# Patient Record
Sex: FEMALE | Race: WHITE | URBAN - METROPOLITAN AREA
[De-identification: names, ages, dates, MRNs, and addresses within clinical notes are randomized per-mention and may not be internally consistent; named-entity substitution may affect disease eponyms.]

---

## 2020-02-18 ENCOUNTER — OFFICE VISIT (OUTPATIENT)
Dept: PRIMARY CARE CLINIC | Age: 30
End: 2020-02-18

## 2020-02-18 VITALS
SYSTOLIC BLOOD PRESSURE: 123 MMHG | BODY MASS INDEX: 31.92 KG/M2 | TEMPERATURE: 98.1 F | HEART RATE: 72 BPM | HEIGHT: 71 IN | WEIGHT: 228 LBS | OXYGEN SATURATION: 98 % | DIASTOLIC BLOOD PRESSURE: 84 MMHG | RESPIRATION RATE: 16 BRPM

## 2020-02-18 DIAGNOSIS — K59.00 CONSTIPATION, UNSPECIFIED CONSTIPATION TYPE: ICD-10-CM

## 2020-02-18 DIAGNOSIS — E66.9 OBESITY (BMI 30.0-34.9): Primary | ICD-10-CM

## 2020-02-18 RX ORDER — NORGESTIMATE AND ETHINYL ESTRADIOL 0.25-0.035
1 KIT ORAL DAILY
COMMUNITY
Start: 2019-12-13

## 2020-02-18 RX ORDER — POLYETHYLENE GLYCOL 3350 17 G/17G
17 POWDER, FOR SOLUTION ORAL DAILY
Qty: 510 G | Refills: 0 | Status: SHIPPED | OUTPATIENT
Start: 2020-02-18 | End: 2020-06-23 | Stop reason: SDUPTHER

## 2020-02-18 NOTE — PATIENT INSTRUCTIONS
WEIGHT LOSS RECOMMENDATIONS: 
 
[facility] Medically Supervised Weight Loss Program: - Multidisciplinary & holistic approach to your weight loss - [phone] Fiorella Rincon:  
            - [address] [facility]. [facility] 
 - [phone] 
 - [email or web address] 
 - 3 month initial course - Initial fee + monthly membership fee [facility] - Dr Yakelin Murillo 
 - Priyanka Leavitt. [facility] 
 - [phone] - [email or web address] 
  
ACAC PREP Program (Physician Recommend Exercise Program 
 - $60 for 60 days Weight Watchers: 
 - See website Saba Renee: - See website New Technology: - My Fitness Pal or other Apps for your phone - FitBit or FuelBand Medications: 
 - Contrave (1 tab twice daily) - Saxenda (1 injection daily) - Phentermine (1 tab daily) Aerobic exercise: goal of 3-5 times per week, about 30 minutes Diet changes: limiting daily calorie intake to 2,000. Work on reading nutrition labels on food (in particular the serving size, the calories per serving, and carbohydrates). Work on decreasing portion sizes & snacking. Constipation: Care Instructions Your Care Instructions Constipation means that you have a hard time passing stools (bowel movements). People pass stools from 3 times a day to once every 3 days. What is normal for you may be different. Constipation may occur with pain in the rectum and cramping. The pain may get worse when you try to pass stools. Sometimes there are small amounts of bright red blood on toilet paper or the surface of stools. This is because of enlarged veins near the rectum (hemorrhoids). A few changes in your diet and lifestyle may help you avoid ongoing constipation. Your doctor may also prescribe medicine to help loosen your stool. Some medicines can cause constipation.  These include pain medicines and antidepressants. Tell your doctor about all the medicines you take. Your doctor may want to make a medicine change to ease your symptoms. Follow-up care is a key part of your treatment and safety. Be sure to make and go to all appointments, and call your doctor if you are having problems. It's also a good idea to know your test results and keep a list of the medicines you take. How can you care for yourself at home? · Drink plenty of fluids, enough so that your urine is light yellow or clear like water. If you have kidney, heart, or liver disease and have to limit fluids, talk with your doctor before you increase the amount of fluids you drink. · Include high-fiber foods in your diet each day. These include fruits, vegetables, beans, and whole grains. · Get at least 30 minutes of exercise on most days of the week. Walking is a good choice. You also may want to do other activities, such as running, swimming, cycling, or playing tennis or team sports. · Take a fiber supplement, such as Citrucel or Metamucil, every day. Read and follow all instructions on the label. · Schedule time each day for a bowel movement. A daily routine may help. Take your time having your bowel movement. · Support your feet with a small step stool when you sit on the toilet. This helps flex your hips and places your pelvis in a squatting position. · Your doctor may recommend an over-the-counter laxative to relieve your constipation. Examples are Milk of Magnesia and MiraLax. Read and follow all instructions on the label. Do not use laxatives on a long-term basis. When should you call for help? Call your doctor now or seek immediate medical care if: 
  · You have new or worse belly pain.  
  · You have new or worse nausea or vomiting.  
  · You have blood in your stools.  
 Watch closely for changes in your health, and be sure to contact your doctor if: 
  · Your constipation is getting worse.   · You do not get better as expected. Where can you learn more? Go to http://stalin-oswaldo.info/. Enter 21  in the search box to learn more about \"Constipation: Care Instructions. \" Current as of: June 26, 2019 Content Version: 12.2 © 4016-0110 [employer], Perfect[employer]. Care instructions adapted under license by [employer] (which disclaims liability or warranty for this information). If you have questions about a medical condition or this instruction, always ask your healthcare professional. [address] any warranty or liability for your use of this information.

## 2020-02-18 NOTE — PROGRESS NOTES
Artur Fitzgerald is a 30 y.o. female    Chief Complaint   Patient presents with   [facility]     has been trying to loose weight, measures calories works out 6 days a week- wants to check thyroid,     Other     does not have BM besides every 7-10 days, not constipated but knows this isn't normal. Never seen GI.        1. Have you been to the ER, urgent care clinic since your last visit? Hospitalized since your last visit? No    2. Have you seen or consulted any other health care providers outside of the [address] [place] [address] since your last visit? Include any pap smears or colon screening. No    No flowsheet data found.      Health Maintenance Due   Topic Date Due    DTaP/Tdap/Td series (1 - Tdap) 11/29/2002    PAP AKA CERVICAL CYTOLOGY  11/29/2012    Influenza Age 5 to Adult  08/01/2019

## 2020-02-18 NOTE — PROGRESS NOTES
HPI:     Chief Complaint   Patient presents with    Establish Care    Weight Management     has been trying to loose weight, measures calories works out 6 days a week- wants to check thyroid,     Other     does not have BM besides every 7-10 days, not constipated but knows this isn't normal. Never seen GI. Patient is a 30 y.o. female with no significant history who presents as new patient for evaluation of weight and constipation. Patient reports she has been struggling with her weight over the past couple years despite diet and exercise. She works out 6 days per week since summer 2019 for 1.5-2 hours per day, including cardio and weight training. Spends 45 minutes doing cardio (rowing or inclined treadmill). She was previously doing CrossFit. She consumes about 2,000 calories per day. Tried to cut down to 1,600 calories per day as per recommendation of , but this left her very hungry. She rarely eats junk food. Typical breakfast includes eggs and vegetables. Lunch is leftover dinner. Dinner is chicken or fish with grilled veggies, sweet potatoes. Daytime snacks include fruit (orange, apple). She drinks alcohol very rarely. She will lose 2-3 lbs here and there but not consistent. Patient also complains of constipation over the past 3 years. She has very large BM about once per week. Denies bloody stools, abdominal pain, nausea, vomiting. She drinks about 110 oz water daily. She takes probiotics intermittently. Patient has hx of abnormal pap smear s/p colposcopy. She is following regularly with GYN Dr. Duncan Nugent. Patient Active Problem List   Diagnosis Code    Obesity (BMI 30.0-34. 9) E66.9     Current Outpatient Medications   Medication Sig Dispense Refill    SPRINTEC, 28, 0.25-35 mg-mcg tab Take 1 Tab by mouth daily.  polyethylene glycol (MIRALAX) 17 gram/dose powder Take 17 g by mouth daily.  510 g 0     Allergies   Allergen Reactions    Pcn [Penicillins] Hives    Sulfa (Sulfonamide Antibiotics) Hives     Past Medical History:   Diagnosis Date    Abnormal Pap smear of cervix     s/p colpo    Constipation      Past Surgical History:   Procedure Laterality Date    HX APPENDECTOMY  2016    HX COLPOSCOPY      HX TONSIL AND ADENOIDECTOMY      HX TYMPANOSTOMY      HX WISDOM TEETH EXTRACTION       Family History   Problem Relation Age of Onset    Hypertension Mother     No Known Problems Father     Stroke Maternal Grandmother     Heart Disease Maternal Grandfather         CABG    Heart Attack Maternal Grandfather     High Cholesterol Maternal Grandfather     Cancer Maternal Grandfather         bone    Cancer Paternal Grandfather         bone    Diabetes Neg Hx     Thyroid Disease Neg Hx      Social History     Tobacco Use    Smoking status: Former Smoker    Smokeless tobacco: Never Used    Tobacco comment: socially when younger   Substance Use Topics    Alcohol use: Yes     Comment: rarely          ROS:   Pertinent items are noted in HPI. Objective:     Vitals:    02/18/20 0935   BP: 123/84   Pulse: 72   Resp: 16   Temp: 98.1 °F (36.7 °C)   TempSrc: Oral   SpO2: 98%   Weight: 228 lb (103.4 kg)   Height: 5' 10.5\" (1.791 m)        Vitals and Nurse Documentation reviewed.     Physical Examination:   General appearance - alert, well appearing, and in no distress  Mental status - alert, oriented to person, place, and time, normal mood, behavior, speech, dress, motor activity, and thought processes  Eyes - pupils equal and reactive, extraocular eye movements intact  Ears - bilateral TM's and external ear canals normal  Nose - normal and patent, no erythema, discharge or polyps  Mouth - mucous membranes moist, pharynx normal without lesions  Neck - supple, no significant adenopathy, thyroid exam: thyroid is normal in size without nodules or tenderness  Chest - clear to auscultation, no wheezes, rales or rhonchi, symmetric air entry  Heart - normal rate, regular rhythm, normal S1, S2, no murmurs, rubs, clicks or gallops  Abdomen - soft, nontender, nondistended, no masses or organomegaly  Neurological - alert, oriented, normal speech, no focal findings or movement disorder noted  Extremities - peripheral pulses normal, no pedal edema, no clubbing or cyanosis      Assessment/ Plan:   Diagnoses and all orders for this visit:    1. Obesity (BMI 30.0-34.9)  -     BMI 32.25. Patient has been struggling with weight loss. Has been exercising regularly and monitoring intake with only minor losses. Check labs as below. Advised to continue regular exercise, at least 150 minutes per week, and try 1,800 calorie/day diet. Discussed healthy diet choices and limiting fried, fatty foods, fast foods, processed foods, sugar-sweetened beverages/soda, and added sugars. Increase fruits, vegetables, low-fat dairy products, lean proteins, and whole grains. We discussed possibility of medication assistance and patient is interested. We discussed options that patient will look into at home and inquire about insurance coverage. Information provided in AVS.  -     CBC WITH AUTOMATED DIFF  -     METABOLIC PANEL, COMPREHENSIVE  -     THYROID CASCADE PROFILE  -     HEMOGLOBIN A1C WITH EAG    2. Constipation, unspecified constipation type  -     Discussed goal of BM daily to every other day. Start with Miralax. Increase fiber through diet (fruits, vegetables) or supplement. Continue with adequate water intake. Continue exercise. -     CBC WITH AUTOMATED DIFF  -     METABOLIC PANEL, COMPREHENSIVE  -     THYROID CASCADE PROFILE  -     polyethylene glycol (MIRALAX) 17 gram/dose powder; Take 17 g by mouth daily. Medication benefits, risks, indication, dosage, potential adverse effects, and alternate medication options were discussed with patient who expressed understanding. Follow-up and Dispositions    · Return in about 6 weeks (around 3/31/2020) for follow-up. I have discussed the diagnosis with the patient and the intended plan as seen in the above orders. Advised prompt follow-up if symptoms worsen or fail to improve and symptoms that would warrant emergent evaluation in ED. The patient has received an after-visit summary and questions were answered concerning future plans. I have discussed medication side effects and warnings with the patient as well. Patient expressed understanding and is in agreement with the diagnosis and plan.

## 2020-02-19 LAB
ALBUMIN SERPL-MCNC: 4.3 G/DL (ref 3.9–5)
ALBUMIN/GLOB SERPL: 1.9 {RATIO} (ref 1.2–2.2)
ALP SERPL-CCNC: 66 IU/L (ref 39–117)
ALT SERPL-CCNC: 15 IU/L (ref 0–32)
AST SERPL-CCNC: 13 IU/L (ref 0–40)
BASOPHILS # BLD AUTO: 0.1 X10E3/UL (ref 0–0.2)
BASOPHILS NFR BLD AUTO: 1 %
BILIRUB SERPL-MCNC: 0.2 MG/DL (ref 0–1.2)
BUN SERPL-MCNC: 10 MG/DL (ref 6–20)
BUN/CREAT SERPL: 13 (ref 9–23)
CALCIUM SERPL-MCNC: 9.6 MG/DL (ref 8.7–10.2)
CHLORIDE SERPL-SCNC: 102 MMOL/L (ref 96–106)
CO2 SERPL-SCNC: 22 MMOL/L (ref 20–29)
CREAT SERPL-MCNC: 0.77 MG/DL (ref 0.57–1)
EOSINOPHIL # BLD AUTO: 0.1 X10E3/UL (ref 0–0.4)
EOSINOPHIL NFR BLD AUTO: 1 %
ERYTHROCYTE [DISTWIDTH] IN BLOOD BY AUTOMATED COUNT: 12.7 % (ref 11.7–15.4)
EST. AVERAGE GLUCOSE BLD GHB EST-MCNC: 100 MG/DL
GLOBULIN SER CALC-MCNC: 2.3 G/DL (ref 1.5–4.5)
GLUCOSE SERPL-MCNC: 82 MG/DL (ref 65–99)
HBA1C MFR BLD: 5.1 % (ref 4.8–5.6)
HCT VFR BLD AUTO: 37.7 % (ref 34–46.6)
HGB BLD-MCNC: 12.1 G/DL (ref 11.1–15.9)
IMM GRANULOCYTES # BLD AUTO: 0 X10E3/UL (ref 0–0.1)
IMM GRANULOCYTES NFR BLD AUTO: 0 %
LYMPHOCYTES # BLD AUTO: 2.3 X10E3/UL (ref 0.7–3.1)
LYMPHOCYTES NFR BLD AUTO: 25 %
MCH RBC QN AUTO: 28.3 PG (ref 26.6–33)
MCHC RBC AUTO-ENTMCNC: 32.1 G/DL (ref 31.5–35.7)
MCV RBC AUTO: 88 FL (ref 79–97)
MONOCYTES # BLD AUTO: 0.6 X10E3/UL (ref 0.1–0.9)
MONOCYTES NFR BLD AUTO: 7 %
NEUTROPHILS # BLD AUTO: 6.1 X10E3/UL (ref 1.4–7)
NEUTROPHILS NFR BLD AUTO: 66 %
PLATELET # BLD AUTO: 285 X10E3/UL (ref 150–450)
POTASSIUM SERPL-SCNC: 4.4 MMOL/L (ref 3.5–5.2)
PROT SERPL-MCNC: 6.6 G/DL (ref 6–8.5)
RBC # BLD AUTO: 4.28 X10E6/UL (ref 3.77–5.28)
SODIUM SERPL-SCNC: 137 MMOL/L (ref 134–144)
TSH SERPL DL<=0.005 MIU/L-ACNC: 2.42 UIU/ML (ref 0.45–4.5)
WBC # BLD AUTO: 9.3 X10E3/UL (ref 3.4–10.8)

## 2020-02-19 NOTE — PROGRESS NOTES
Please notify patient:    CBC, kidney, liver, thyroid, and average blood sugar level are all normal.

## 2020-03-31 ENCOUNTER — VIRTUAL VISIT (OUTPATIENT)
Dept: PRIMARY CARE CLINIC | Age: 30
End: 2020-03-31

## 2020-03-31 VITALS — WEIGHT: 220 LBS | BODY MASS INDEX: 31.12 KG/M2

## 2020-03-31 DIAGNOSIS — K59.01 SLOW TRANSIT CONSTIPATION: ICD-10-CM

## 2020-03-31 DIAGNOSIS — E66.9 OBESITY (BMI 30.0-34.9): Primary | ICD-10-CM

## 2020-03-31 DIAGNOSIS — Z76.89 ENCOUNTER FOR WEIGHT MANAGEMENT: ICD-10-CM

## 2020-03-31 NOTE — PATIENT INSTRUCTIONS
Eating Healthy Foods: Care Instructions Your Care Instructions Eating healthy foods can help lower your risk for disease. Healthy food gives you energy and keeps your heart strong, your brain active, your muscles working, and your bones strong. A healthy diet includes a variety of foods from the basic food groups: grains, vegetables, fruits, milk and milk products, and meat and beans. Some people may eat more of their favorite foods from only one food group and, as a result, miss getting the nutrients they need. So, it is important to pay attention not only to what you eat but also to what you are missing from your diet. You can eat a healthy, balanced diet by making a few small changes. Follow-up care is a key part of your treatment and safety. Be sure to make and go to all appointments, and call your doctor if you are having problems. It's also a good idea to know your test results and keep a list of the medicines you take. How can you care for yourself at home? Look at what you eat · Keep a food diary for a week or two and record everything you eat or drink. Track the number of servings you eat from each food group. · For a balanced diet every day, eat a variety of: 
? 6 or more ounce-equivalents of grains, such as cereals, breads, crackers, rice, or pasta, every day. An ounce-equivalent is 1 slice of bread, 1 cup of ready-to-eat cereal, or ½ cup of cooked rice, cooked pasta, or cooked cereal. 
? 2½ cups of vegetables, especially: § Dark-green vegetables such as broccoli and spinach. § Orange vegetables such as carrots and sweet potatoes. § Dry beans (such as mendez and kidney beans) and peas (such as lentils). ? 2 cups of fresh, frozen, or canned fruit. A small apple or 1 banana or orange equals 1 cup. ? 3 cups of nonfat or low-fat milk, yogurt, or other milk products.  
? 5½ ounces of meat and beans, such as chicken, fish, lean meat, beans, nuts, and seeds. One egg, 1 tablespoon of peanut butter, ½ ounce nuts or seeds, or ¼ cup of cooked beans equals 1 ounce of meat. · Learn how to read food labels for serving sizes and ingredients. Fast-food and convenience-food meals often contain few or no fruits or vegetables. Make sure you eat some fruits and vegetables to make the meal more nutritious. · Look at your food diary. For each food group, add up what you have eaten and then divide the total by the number of days. This will give you an idea of how much you are eating from each food group. See if you can find some ways to change your diet to make it more healthy. Start small · Do not try to make dramatic changes to your diet all at once. You might feel that you are missing out on your favorite foods and then be more likely to fail. · Start slowly, and gradually change your habits. Try some of the following: ? Use whole wheat bread instead of white bread. ? Use nonfat or low-fat milk instead of whole milk. ? Eat brown rice instead of white rice, and eat whole wheat pasta instead of white-flour pasta. ? Try low-fat cheeses and low-fat yogurt. ? Add more fruits and vegetables to meals and have them for snacks. ? Add lettuce, tomato, cucumber, and onion to sandwiches. ? Add fruit to yogurt and cereal. 
Enjoy food · You can still eat your favorite foods. You just may need to eat less of them. If your favorite foods are high in fat, salt, and sugar, limit how often you eat them, but do not cut them out entirely. · Eat a wide variety of foods. Make healthy choices when eating out · The type of restaurant you choose can help you make healthy choices. Even fast-food chains are now offering more low-fat or healthier choices on the menu. · Choose smaller portions, or take half of your meal home. · When eating out, try: ? A veggie pizza with a whole wheat crust or grilled chicken (instead of sausage or pepperoni). ? Pasta with roasted vegetables, grilled chicken, or marinara sauce instead of cream sauce. ? A vegetable wrap or grilled chicken wrap. ? Broiled or poached food instead of fried or breaded items. Make healthy choices easy · Buy packaged, prewashed, ready-to-eat fresh vegetables and fruits, such as baby carrots, salad mixes, and chopped or shredded broccoli and cauliflower. · Buy packaged, presliced fruits, such as melon or pineapple. · Choose 100% fruit or vegetable juice instead of soda. Limit juice intake to 4 to 6 oz (½ to ¾ cup) a day. · Blend low-fat yogurt, fruit juice, and canned or frozen fruit to make a smoothie for breakfast or a snack. Where can you learn more? Go to http://stalin-oswaldo.info/ Enter T756 in the search box to learn more about \"Eating Healthy Foods: Care Instructions. \" Current as of: August 21, 2019Content Version: 12.4 © 7580-0007 Healthwise, Incorporated. Care instructions adapted under license by [employer] (which disclaims liability or warranty for this information). If you have questions about a medical condition or this instruction, always ask your healthcare professional. [address] any warranty or liability for your use of this information.

## 2020-03-31 NOTE — PROGRESS NOTES
Vernon Chavez is a 30 y.o. female who was seen by synchronous (real-time) audio-video technology on 3/31/2020. Consent:  She and/or her healthcare decision maker is aware that this patient-initiated Telehealth encounter is a billable service, with coverage as determined by her insurance carrier. She is aware that she may receive a bill and has provided verbal consent to proceed: Yes    I was in the office while conducting this encounter. Assessment & Plan:   Diagnoses and all orders for this visit:    1. Obesity (BMI 30.0-34.9)         -     Has lost 8 lbs since last visit. Encouraged to continue with healthy diet and exercise. Advised at least 150 minutes of physical activity per week, back to gym when COVID crisis resolves. Continue to track calories, goal 1,600 deanne/day. Discussed healthy diet options and meal planning. Recommended frequent small meals. Nutrient dense foods, plenty of fruits, vegetables, whole grains. Continue to avoid junk/processed/nutrient poor foods. Choose healthy options when eating out. Patient is not interested in medication assistance at this time, is confident she can lose weight on her own with lifestyle modification. 2. Encounter for weight management          -     See #1.    3. Slow transit constipation          -      Improved. Continue Miralax as needed, plenty of fiber and water. Coding Help - Use CPT Codes 89960-59820, 85534-94264 for Established and New Patients respectively, either employing EM elements or Time rules. Other codes (example consult codes) may also apply. I spent at least 15 minutes with this established patient, and >50% of the time was spent counseling and/or coordinating care regarding weight management. 712  Subjective: Veronn Chavez was seen for Follow-up and Weight Management      Patient presents for follow-up from last visit. Patient has been struggling with weight over the past couple years.   She is motivated to lose weight and has been working on diet and exercise. She has lost 8 lbs since last visit 6 weeks ago. She did have oral surgery in the interim and was on liquid/soft diet for a couple weeks. Due to COVID-19 she is limited in her ability to go to the gym as she had been previously (6 days in the gym, 45 minutes cardio/rowing/inclined treadmill + weight training). Instead she has been brisk walking 2-2.5 miles everyday with her dogs. She has cut back on her daily calories, 2,000/day to 1,600-1,700/day. She has been tracking calories with phone dmitri. She does not consume junk food. Eats plenty of fruits and vegetables. No red meat. Daytime snacking is usually fruit. Drinks alcohol rarely. She has noticed improvement with bowel movements since starting Miralax, which she uses as needed about twice per week. She now has BM about 4 times per week, which is improvement from once weekly BM. Patient otherwise feels well today and has no concerns or complaints. Prior to Admission medications    Medication Sig Start Date End Date Taking? Authorizing Provider   [address] [place] [address], , 0.25-35 mg-mcg tab Take 1 Tab by mouth daily. 12/13/19   Provider, Historical   polyethylene glycol (MIRALAX) 17 gram/dose powder Take 17 g by mouth daily. 5/83/72   Jourdan Hale NP     Allergies   Allergen Reactions    Pcn [Penicillins] Hives    Sulfa (Sulfonamide Antibiotics) Hives       Patient Active Problem List   Diagnosis Code    Obesity (BMI 30.0-34. 9) E66.9     Past Medical History:   Diagnosis Date    Abnormal Pap smear of cervix     s/p colpo    Constipation        Review of Systems   Constitutional: Negative for chills and fever. Respiratory: Negative for cough and shortness of breath. Cardiovascular: Negative for chest pain and palpitations. Gastrointestinal: Negative for abdominal pain, diarrhea, nausea and vomiting.        PHYSICAL EXAMINATION:  [ INSTRUCTIONS:  \"[x]\" Indicates a positive item \"[]\" Indicates a negative item  -- DELETE ALL ITEMS NOT EXAMINED]  Vital Signs: (As obtained by patient/caregiver at home)  Visit Vitals  Wt 220 lb (99.8 kg)   BMI 31.12 kg/m²        Constitutional: [x] Appears well-developed and well-nourished [x] No apparent distress      [] Abnormal -     Mental status: [x] Alert and awake  [x] Oriented to person/place/time [x] Able to follow commands    [] Abnormal -     Eyes:   EOM    [x]  Normal    [] Abnormal -   Sclera  [x]  Normal    [] Abnormal -          Discharge [x]  None visible   [] Abnormal -     HENT: [x] Normocephalic, atraumatic  [] Abnormal -   [x] Mouth/Throat: Mucous membranes are moist    External Ears [x] Normal  [] Abnormal -    Neck: [x] No visualized mass [] Abnormal -     Pulmonary/Chest: [x] Respiratory effort normal   [x] No visualized signs of difficulty breathing or respiratory distress        [] Abnormal -      Musculoskeletal:   [x] Normal gait with no signs of ataxia         [x] Normal range of motion of neck        [] Abnormal -     Neurological:        [x] No Facial Asymmetry (Cranial nerve 7 motor function) (limited exam due to video visit)          [x] No gaze palsy        [] Abnormal -          Skin:        [x] No significant exanthematous lesions or discoloration noted on facial skin         [] Abnormal -            Psychiatric:       [x] Normal Affect [] Abnormal -        [x] No Hallucinations    Other pertinent observable physical exam findings:-      THIS VISIT WAS COMPLETED VIRTUALLY USING [employer] VIDEO VISIT. We discussed the expected course, resolution and complications of the diagnosis(es) in detail. Medication risks, benefits, costs, interactions, and alternatives were discussed as indicated. I advised her to contact the office if her condition worsens, changes or fails to improve as anticipated. She expressed understanding with the diagnosis(es) and plan.      Pursuant to the emergency declaration under the [address] and the National Emergencies Act, [facility] waiver authority and the [employer] and [employer] Act, this Virtual  Visit was conducted, with patient's consent, to reduce the patient's risk of exposure to COVID-19 and provide continuity of care for an established patient. Services were provided through a video synchronous discussion virtually to substitute for in-person clinic visit.     Enmanuel Wolfe NP

## 2020-06-23 ENCOUNTER — VIRTUAL VISIT (OUTPATIENT)
Dept: PRIMARY CARE CLINIC | Age: 30
End: 2020-06-23

## 2020-06-23 DIAGNOSIS — K59.00 CONSTIPATION, UNSPECIFIED CONSTIPATION TYPE: Primary | ICD-10-CM

## 2020-06-23 RX ORDER — POLYETHYLENE GLYCOL 3350 17 G/17G
POWDER, FOR SOLUTION ORAL
Qty: 510 G | Refills: 1 | Status: SHIPPED | OUTPATIENT
Start: 2020-06-23

## 2020-06-23 NOTE — PROGRESS NOTES
Jamil Olvera is a 30 y.o. female who was seen by synchronous (real-time) audio-video technology on 6/23/2020. Consent: Jamil Olvera, who was seen by synchronous (real-time) audio-video technology, and/or her healthcare decision maker, is aware that this patient-initiated, Telehealth encounter on 6/23/2020 is a billable service, with coverage as determined by her insurance carrier. She is aware that she may receive a bill and has provided verbal consent to proceed: Yes. Assessment & Plan:   Diagnoses and all orders for this visit:    1. Constipation, unspecified constipation type  -    Advised start taking Miralax regularly. Advised to follow up with GI to r/o any IBS, IBD. Discussed about Linzess options as well. Hydration, high fibre intake. polyethylene glycol (MIRALAX) 17 gram/dose powder; Take one capful BID for 7 days and then once/day afterwards.  -     REFERRAL TO GASTROENTEROLOGY- contact info sent through my chart message to her. Follow-up and Dispositions    · Return if symptoms worsen or fail to improve. Subjective: Jamil Olvera is a 30 y.o. female who was seen for Other (BM once/week or sometimes even longer. Feels full. )      She is new to me but established in office. This is a 31 y/o F is here with concern about not having BM regularly. Symptoms started about a year ago. About 4 months ago she was started on Miralax. She did well when she was taking the Miralax but lately BM is once/week or sometimes even comes more than week after. She is taking Miralax but very rarely. Stool is usually soft and normal looking. No blood. She does not eat and  junk food. Eats plenty of water, fruits and vegetables. No red meat. No family hx of colon cancer, IBS, IBD. Denies any anxiety, abdominal pain. Recent thyroid level was in normal range.     Lab Results   Component Value Date/Time    TSH 2.420 02/18/2020 10:20 AM            Prior to Admission medications    Medication Sig Start Date End Date Taking? Authorizing Provider   polyethylene glycol (MIRALAX) 17 gram/dose powder Take one capful BID for 7 days and then once/day afterwards. 6/23/20  Yes Wayne Lacey MD   SPRINTEC, 28, 0.25-35 mg-mcg tab Take 1 Tab by mouth daily. 12/13/19   Provider, Historical     Allergies   Allergen Reactions    Pcn [Penicillins] Hives    Sulfa (Sulfonamide Antibiotics) Hives       Patient Active Problem List   Diagnosis Code    Obesity (BMI 30.0-34. 9) E66.9     Current Outpatient Medications   Medication Sig Dispense Refill    polyethylene glycol (MIRALAX) 17 gram/dose powder Take one capful BID for 7 days and then once/day afterwards. 510 g 1    SPRINTEC, 28, 0.25-35 mg-mcg tab Take 1 Tab by mouth daily. Allergies   Allergen Reactions    Pcn [Penicillins] Hives    Sulfa (Sulfonamide Antibiotics) Hives     Past Medical History:   Diagnosis Date    Abnormal Pap smear of cervix     s/p colpo    Constipation        ROS    Same as #1  Objective:   Vital Signs: (As obtained by patient/caregiver at home)  There were no vitals taken for this visit.      [INSTRUCTIONS:  \"[x]\" Indicates a positive item  \"[]\" Indicates a negative item  -- DELETE ALL ITEMS NOT EXAMINED]    Constitutional: [x] Appears well-developed and well-nourished [x] No apparent distress      [] Abnormal -     Mental status: [x] Alert and awake  [x] Oriented to person/place/time [x] Able to follow commands    [] Abnormal -     Eyes:   EOM    [x]  Normal    [] Abnormal -   Sclera  [x]  Normal    [] Abnormal -          Discharge [x]  None visible   [] Abnormal -     HENT: [x] Normocephalic, atraumatic  [] Abnormal -   [x] Mouth/Throat: Mucous membranes are moist    External Ears [x] Normal  [] Abnormal -    Neck: [x] No visualized mass [] Abnormal -     Pulmonary/Chest: [x] Respiratory effort normal   [x] No visualized signs of difficulty breathing or respiratory distress        [] Abnormal - Musculoskeletal:   [] Normal gait with no signs of ataxia         [x] Normal range of motion of neck        [] Abnormal -     Neurological:        [x] No Facial Asymmetry (Cranial nerve 7 motor function) (limited exam due to video visit)          [x] No gaze palsy        [] Abnormal -          Skin:        [x] No significant exanthematous lesions or discoloration noted on facial skin         [] Abnormal -            Psychiatric:       [x] Normal Affect [] Abnormal -        [] No Hallucinations    Other pertinent observable physical exam findings:-        We discussed the expected course, resolution and complications of the diagnosis(es) in detail. Medication risks, benefits, costs, interactions, and alternatives were discussed as indicated. I advised her to contact the office if her condition worsens, changes or fails to improve as anticipated. She expressed understanding with the diagnosis(es) and plan. Williams Gurrola is a 30 y.o. female who was evaluated by a video visit encounter for concerns as above. Patient identification was verified prior to start of the visit. A caregiver was present when appropriate. Due to this being a TeleHealth encounter (During [facility]- public health emergency), evaluation of the following organ systems was limited: Vitals/Constitutional/EENT/Resp/CV/GI//MS/Neuro/Skin/Heme-Lymph-Imm. Pursuant to the emergency declaration under the [facility], [facility] waiver authority and the [employer] and [employer] Act, this Virtual  Visit was conducted, with patient's (and/or legal guardian's) consent, to reduce the patient's risk of exposure to COVID-19 and provide necessary medical care. Services were provided through a video synchronous discussion virtually to substitute for in-person clinic visit.         Nina Gonzalez MD

## 2020-12-04 ENCOUNTER — OFFICE VISIT (OUTPATIENT)
Dept: PRIMARY CARE CLINIC | Age: 30
End: 2020-12-04
Payer: COMMERCIAL

## 2020-12-04 VITALS
HEIGHT: 71 IN | TEMPERATURE: 98 F | RESPIRATION RATE: 16 BRPM | HEART RATE: 83 BPM | BODY MASS INDEX: 29.82 KG/M2 | OXYGEN SATURATION: 99 % | WEIGHT: 213 LBS | DIASTOLIC BLOOD PRESSURE: 84 MMHG | SYSTOLIC BLOOD PRESSURE: 124 MMHG

## 2020-12-04 DIAGNOSIS — Z79.899 HIGH RISK MEDICATION USE: ICD-10-CM

## 2020-12-04 DIAGNOSIS — Z71.3 WEIGHT LOSS COUNSELING, ENCOUNTER FOR: Primary | ICD-10-CM

## 2020-12-04 PROCEDURE — 93000 ELECTROCARDIOGRAM COMPLETE: CPT | Performed by: FAMILY MEDICINE

## 2020-12-04 PROCEDURE — 99214 OFFICE O/P EST MOD 30 MIN: CPT | Performed by: FAMILY MEDICINE

## 2020-12-04 RX ORDER — PHENTERMINE HYDROCHLORIDE 15 MG/1
15 CAPSULE ORAL
Qty: 30 CAP | Refills: 1 | Status: SHIPPED | OUTPATIENT
Start: 2020-12-04 | End: 2021-01-26 | Stop reason: SDUPTHER

## 2020-12-04 NOTE — PROGRESS NOTES
Subjective:     Chief Complaint   Patient presents with    Weight Management        She  is a 35y.o. year old female who presents today for weight management. She reports that she has tried different method to loose weight but nothing worked. Keto fo a while, not sustainable. Gotten down to 200 lbs after the keto diet but gained again to 213 today. Work out 4-5 days/week about 60 minutes each sessions. Have been watching diet very closely. Lab Results   Component Value Date/Time    TSH 2.420 02/18/2020 10:20 AM         Pertinent items are noted in HPI.   Objective:     Vitals:    12/04/20 0958   BP: 124/84   Pulse: 83   Resp: 16   Temp: 98 °F (36.7 °C)   TempSrc: Temporal   SpO2: 99%   Weight: 213 lb (96.6 kg)   Height: 5' 10.5\" (1.791 m)       Physical Examination: General appearance - alert, well appearing, and in no distress, oriented to person, place, and time and overweight  Mental status - alert, oriented to person, place, and time, normal mood, behavior, speech, dress, motor activity, and thought processes  Chest - clear to auscultation, no wheezes, rales or rhonchi, symmetric air entry  Heart - normal rate, regular rhythm, normal S1, S2, no murmurs, rubs, clicks or gallops    Allergies   Allergen Reactions    Amoxicillin-Pot Clavulanate Hives    Pcn [Penicillins] Hives    Sulfa (Sulfonamide Antibiotics) Hives      Social History     Socioeconomic History    Marital status: SINGLE     Spouse name: Not on file    Number of children: Not on file    Years of education: Not on file    Highest education level: Not on file   Tobacco Use    Smoking status: Former Smoker    Smokeless tobacco: Never Used    Tobacco comment: socially when younger   Substance and Sexual Activity    Alcohol use: Yes     Comment: rarely    Drug use: Never    Sexual activity: Yes     Partners: Male     Birth control/protection: Pill      Family History   Problem Relation Age of Onset    Hypertension Mother     No Known Problems Father     Stroke Maternal Grandmother     Heart Disease Maternal Grandfather         CABG    Heart Attack Maternal Grandfather     High Cholesterol Maternal Grandfather     Cancer Maternal Grandfather         bone    Cancer Paternal Grandfather         bone    Diabetes Neg Hx     Thyroid Disease Neg Hx       Past Surgical History:   Procedure Laterality Date    HX APPENDECTOMY  2016    HX COLPOSCOPY      HX TONSIL AND ADENOIDECTOMY      HX TYMPANOSTOMY      HX WISDOM TEETH EXTRACTION        Past Medical History:   Diagnosis Date    Abnormal Pap smear of cervix     s/p colpo    Constipation       Current Outpatient Medications   Medication Sig Dispense Refill    polyethylene glycol (MIRALAX) 17 gram/dose powder Take one capful BID for 7 days and then once/day afterwards. 510 g 1    SPRINTEC, 28, 0.25-35 mg-mcg tab Take 1 Tab by mouth daily. Assessment/ Plan:   Diagnoses and all orders for this visit:    1. Weight loss counseling, encounter for  -     AMB POC EKG ROUTINE W/ 12 LEADS, INTER & REP-  Is normal.  -     phentermine (ADIPEX_P) 15 mg capsule; Take 1 Cap by mouth every morning. Max Daily Amount: 15 mg.  -     REFERRAL TO DIABETIC EDUCATION; Standing              Continue work on diet and exercise. 2. BMI 30.0-30.9,adult  -     phentermine (ADIPEX_P) 15 mg capsule; Take 1 Cap by mouth every morning. Max Daily Amount: 15 mg.  -     REFERRAL TO DIABETIC EDUCATION; Standing    3. High risk medication use  -     AMB POC EKG ROUTINE W/ 12 LEADS, INTER & REP  -     phentermine (ADIPEX_P) 15 mg capsule; Take 1 Cap by mouth every morning. Max Daily Amount: 15 mg. Medication risks/benefits/costs/interactions/alternatives discussed with patient.   Advised patient to call back or return to office if symptoms worsen/change/persist. If patient cannot reach us or should anything more severe/urgent arise he/she should proceed directly to the nearest emergency department. Discussed expected course/resolution/complications of diagnosis in detail with patient. Patient given a written after visit summary which includes her diagnoses, current medications and vitals. Patient expressed understanding with the diagnosis and plan. Follow-up and Dispositions    · Return in about 6 weeks (around 1/15/2021), or if symptoms worsen or fail to improve, for weight.

## 2020-12-04 NOTE — PROGRESS NOTES
Oscar Brewer is a 35 y.o. female  Chief Complaint   Patient presents with    Weight Management     Health Maintenance Due   Topic Date Due    DTaP/Tdap/Td series (1 - Tdap) 11/29/2012    PAP AKA CERVICAL CYTOLOGY  03/05/2020    Flu Vaccine (1) 09/01/2020     Visit Vitals  /84 (BP 1 Location: Left arm, BP Patient Position: Sitting)   Pulse 83   Temp 98 °F (36.7 °C) (Temporal)   Resp 16   Ht 5' 10.5\" (1.791 m)   Wt 213 lb (96.6 kg)   SpO2 99%   BMI 30.13 kg/m²     1. Have you been to the ER, urgent care clinic since your last visit? Hospitalized since your last visit? No    2. Have you seen or consulted any other health care providers outside of the [address] [place] [address] since your last visit? Include any pap smears or colon screening.  No

## 2020-12-11 ENCOUNTER — VIRTUAL VISIT (OUTPATIENT)
Dept: DIABETES SERVICES | Age: 30
End: 2020-12-11

## 2020-12-11 DIAGNOSIS — Z71.3 WEIGHT LOSS COUNSELING, ENCOUNTER FOR: ICD-10-CM

## 2020-12-11 PROCEDURE — 97802 MEDICAL NUTRITION INDIV IN: CPT | Performed by: DIETITIAN, REGISTERED

## 2020-12-11 NOTE — PROGRESS NOTES
[facility] Health  Initial Nutrition Assessment    Patient's Name: Nikki Foley  Date: 12/11/2020  Reason for Referral: Obesity/wt loss  Referral Source: Kvng Rivers MD    Nutrition Assessment:  Pertinent Medical History:  Past Medical History:   Diagnosis Date    Abnormal Pap smear of cervix     s/p colpo    Constipation      Past Surgical History:   Procedure Laterality Date    HX APPENDECTOMY  2016    HX COLPOSCOPY      HX TONSIL AND ADENOIDECTOMY      HX TYMPANOSTOMY      HX WISDOM TEETH EXTRACTION       Patient Active Problem List   Diagnosis Code    Obesity (BMI 30.0-34. 9) E66.9       Pertinent Medications/Supplements:  Prior to Admission medications    Medication Sig Start Date End Date Taking? Authorizing Provider   phentermine (ADIPEX_P) 15 mg capsule Take 1 Cap by mouth every morning. Max Daily Amount: 15 mg. 12/4/20   Wayne Lacey MD   polyethylene glycol (MIRALAX) 17 gram/dose powder Take one capful BID for 7 days and then once/day afterwards. 6/23/20   Wayne Lacey MD   SPRINTEC, 28, 0.25-35 mg-mcg tab Take 1 Tab by mouth daily. 12/13/19   Provider, Historical       Vitamin/mineral supplements: none    Pertinent Biochemical Data:  Lab Results   Component Value Date/Time    Sodium 137 02/18/2020 10:20 AM    Potassium 4.4 02/18/2020 10:20 AM    Chloride 102 02/18/2020 10:20 AM    CO2 22 02/18/2020 10:20 AM    Glucose 82 02/18/2020 10:20 AM    BUN 10 02/18/2020 10:20 AM    Creatinine 0.77 02/18/2020 10:20 AM    BUN/Creatinine ratio 13 02/18/2020 10:20 AM    GFR est  02/18/2020 10:20 AM    GFR est non- 02/18/2020 10:20 AM    Calcium 9.6 02/18/2020 10:20 AM    Bilirubin, total 0.2 02/18/2020 10:20 AM    Alk.  phosphatase 66 02/18/2020 10:20 AM    Protein, total 6.6 02/18/2020 10:20 AM    Albumin 4.3 02/18/2020 10:20 AM    A-G Ratio 1.9 02/18/2020 10:20 AM    ALT (SGPT) 15 02/18/2020 10:20 AM    AST (SGOT) 13 02/18/2020 10:20 AM      Lab Results   Component Value Date/Time    Sodium 137 02/18/2020 10:20 AM    Potassium 4.4 02/18/2020 10:20 AM    Chloride 102 02/18/2020 10:20 AM    CO2 22 02/18/2020 10:20 AM    Glucose 82 02/18/2020 10:20 AM    BUN 10 02/18/2020 10:20 AM    Creatinine 0.77 02/18/2020 10:20 AM    BUN/Creatinine ratio 13 02/18/2020 10:20 AM    GFR est  02/18/2020 10:20 AM    GFR est non- 02/18/2020 10:20 AM    Calcium 9.6 02/18/2020 10:20 AM      No results found for: CHOL, CHOLPOCT, CHOLX, CHLST, CHOLV, HDL, HDLPOC, HDLP, LDL, LDLCPOC, LDLC, DLDLP, VLDLC, VLDL, TGLX, TRIGL, TRIGP, TGLPOCT, CHHD, CHHDX  Lab Results   Component Value Date/Time    Hemoglobin A1c 5.1 02/18/2020 10:20 AM     No results found for: MCACR, MCA1, MCA2, MCA3, MCAU, MCAU2, MCALPOCT  BP Readings from Last 2 Encounters:   12/04/20 124/84   02/18/20 123/84        Home blood glucose records: does not have diabetes    Anthropometrics:  Height:   Ht Readings from Last 1 Encounters:   12/04/20 5' 10.5\" (1.791 m)     Weight:   Wt Readings from Last 1 Encounters:   12/04/20 213 lb (96.6 kg)     IBW: 96.6 kg (142%IBW)     UBW: 91 kg     Adjusted BW: n/a kg  BMI: 30.13 kg/m2     BMI indicative of: Obesity (Class I)  Goal Weight: 86.3 kg  Weight hx:    Wt Readings from Last 3 Encounters:   12/04/20 213 lb (96.6 kg)   03/31/20 220 lb (99.8 kg)   02/18/20 228 lb (103.4 kg)        Food- and nutrition-related history:  Pt's perceptions, values, motivation, barriers r/t nutrition problem: not picky, does not eat red meat often because it makes stomach upset  Prior education with RDN: none  Prior DSMES: none  Current or previous diets: keto and others  Food allergies: nka  Eating environment/psychosocial/support: lives in house with fiance; sister has hx of ED  Knowledge/beliefs/attitudes about food/nutrition/health: eats healthy and exercises properly - does not like how some clothes fit and is watching diet closely  Food security: n/a  Cultural/Sabianism influences: none  Gastrointestinal: BM every 7-10 days; bloating with period; not taking miralax  Other factors: n/a    Ms. Domingo Landeros came to me today because she has tried for a long time lose weight and cannot seem to get to her goal weight. She got there using keto but realized how unsustainable it was and stopped then gained all of the weight back. The first thing I mentioned to her was that all her labs are totally normal and she is a perfectly healthy 35year old human. I also informed her that her BMI is right on the cusp of obese and her body may just like where it is at with her weight. She is a tall person and BMI is not the best tool for measuring muscle mass. I told her with her weightlifting she could have gained more muscle and that could be causing the extra pounds but BMI does not account for that. She expressed understanding and was very open to all of the material we talked about today. She is already eating generally healthy but there are a few things she could improve on. Pt wants to be able to try on wedding dresses comfortably for her wedding in 2022. Pt is exercising 4-5x/week for 60 minutes with 10-15 min warm up of walking on the treadmill and weightlifting for the rest of the time. Ms. Domingo Landeros reports she does not get super sweaty during this workout and according to her fitness watch her HR gets to 160-70 bpm and she burns ~450 kcal depending on the day. We discussed increasing cardio is good for her heart health and may allow her to burn a few extra calories. We also discussed mindful eating and taking more time than 15 minutes to eat her meals so her body has time to respond to the food she is putting in it. Pt is taking phentermine and says she definitely does not feel as hungry as normal but she is not having any other side effects.     24-hour recall:  Breakfast (8-9am): 2 eggs made with olive oil spray, ~1.5 pcs toast with ~2tbsp Mexican cheese blend  Lunch (1-1:30 pm): ~1/2 c shredded chicken with [employer] [place] dressing, cream cheese, parmesan, and 1 c whole wheat pasta  Dinner (6:30-45): 1/4lb hamburger with 1 slice American cheese on a regular white bun  Snacks (n/a): n/a  Beverages: 8 oz Dr. Opal Ham and 2-4 20 oz containers of water every meal (~160 oz/day sometimes)    Estimated calorie intake per recall: 1600 kcal (Inadequate)  Estimated protein intake per recall: 60 gm protein (Inadequate)    Activity level: Active    Estimated Nutrition Needs:  Calorie needs (using [place] St Jeor x 1. 4AF): 2500 kcal/day - 500kcal reduction ~2000 kcal  Protein needs (using 0.8-1 gm/kg Actual BW): 77-96 gm protein/day    Nutrition Diagnosis: (12/11/2020): (NC-3.4) Unintended weight gain related to food and nutrition knowledge deficit as evidenced by lack of vegetable intake per pt recall, pt report of >1 gallon water intake per day (most days). Nutrition Interventions:  (NP-1.1) General healthful diet - recommended fruit and vegetable intake of 1 cup (~2 servings) for lunch and dinner  NP-1.1 Recommended fluid intake of less than 1 gallon or less per day    Patient Goals: pt is going to create a healthy plate at dinner at least 4 times per week  She is also going to increase her cardio once per week. Resources Provided/Reviewed: Healthy Plate  Nutrition Facts label reading  Saturated fats  Information Reviewed with: Patient Pt was receptive and exhibited understanding of the material presented    Nutrition Monitoring/Evaluation:  (12/11/2020): (FH-1.1. 1) Energy intake using 24 hr recall  (FH-1.2. 1) Fluid intake using 24 hr recall  (AD-1.1) Weight history over the next 6 weeks      MNT Follow-up Visit: to be scheduled when she schedules f/u appt with Dr. Rashi Mars [employer] for Telehealth:     Светлана Cordero is a 35 y.o. female being evaluated through a synchronous (real-time) audio-video technology platform, as a substitution for an in-person encounter, to address the healthcare issues mentioned above. A caregiver was present when appropriate. I was in the office. The patient was at home. The patient and/or her healthcare decision maker, is aware that this patient-initiated, Telehealth encounter on 12/11/2020 is a billable service, with coverage as determined by her insurance carrier. She is aware that she may receive a bill and has provided verbal consent to proceed: Yes. This telehealth encounter occurred during the COVID-19 pandemic and public health emergency. Evaluation of the following organ systems was limited: Vitals/Constitutional/EENT/Resp/CV/GI//MS/Neuro/Skin/Heme-Lymph-Imm. Pursuant to the emergency declaration under the [address] [place] [address], [address] [place] [address] authority and the [employer] and [employer] Act, this Virtual Visit was conducted with patient's (and/or legal guardian's) consent, to reduce the risk of exposure to COVID-19 and provide necessary medical care. --Diane Bower RD on 12/11/2020 at 1:35 PM    An electronic signature was used to authenticate this note.  I was in the office for the appointment and time spent: 63 minutes

## 2021-01-26 ENCOUNTER — OFFICE VISIT (OUTPATIENT)
Dept: PRIMARY CARE CLINIC | Age: 31
End: 2021-01-26

## 2021-01-26 ENCOUNTER — CLINICAL SUPPORT (OUTPATIENT)
Dept: DIABETES SERVICES | Age: 31
End: 2021-01-26
Payer: COMMERCIAL

## 2021-01-26 VITALS
RESPIRATION RATE: 16 BRPM | SYSTOLIC BLOOD PRESSURE: 130 MMHG | TEMPERATURE: 98 F | WEIGHT: 211 LBS | BODY MASS INDEX: 29.54 KG/M2 | OXYGEN SATURATION: 96 % | HEART RATE: 68 BPM | DIASTOLIC BLOOD PRESSURE: 84 MMHG | HEIGHT: 71 IN

## 2021-01-26 DIAGNOSIS — Z71.3 WEIGHT LOSS COUNSELING, ENCOUNTER FOR: ICD-10-CM

## 2021-01-26 DIAGNOSIS — Z71.3 WEIGHT LOSS COUNSELING, ENCOUNTER FOR: Primary | ICD-10-CM

## 2021-01-26 PROCEDURE — 97803 MED NUTRITION INDIV SUBSEQ: CPT | Performed by: DIETITIAN, REGISTERED

## 2021-01-26 PROCEDURE — 99213 OFFICE O/P EST LOW 20 MIN: CPT | Performed by: FAMILY MEDICINE

## 2021-01-26 RX ORDER — PHENTERMINE HYDROCHLORIDE 15 MG/1
15 CAPSULE ORAL
Qty: 30 CAP | Refills: 1 | Status: SHIPPED | OUTPATIENT
Start: 2021-01-26

## 2021-01-26 NOTE — PROGRESS NOTES
Subjective:     Chief Complaint   Patient presents with    Weight Management        She  is a 35y.o. year old female who presents today for follow up on weight. She was started on Phentermine about 6 weeks ago and  continued to maintain healthy lifestyle. She has started following up with a nutritionist.  She reports that Phentermine helping her curving her appetite. No side effects. Unable to do exercise for the past two weeks but planning to start soon. At home weight was 209 lbs today. Wt Readings from Last 3 Encounters:   01/26/21 211 lb (95.7 kg)   12/04/20 213 lb (96.6 kg)   03/31/20 220 lb (99.8 kg)         Pertinent items are noted in HPI.   Objective:     Vitals:    01/26/21 0954   BP: 130/84   Pulse: 68   Resp: 16   Temp: 98 °F (36.7 °C)   TempSrc: Oral   SpO2: 96%   Weight: 211 lb (95.7 kg)   Height: 5' 10.5\" (1.791 m)       Physical Examination: General appearance - alert, well appearing, and in no distress, oriented to person, place, and time and overweight  Mental status - alert, oriented to person, place, and time, normal mood, behavior, speech, dress, motor activity, and thought processes  Chest - clear to auscultation, no wheezes, rales or rhonchi, symmetric air entry  Heart - normal rate, regular rhythm, normal S1, S2, no murmurs, rubs, clicks or gallops    Allergies   Allergen Reactions    Amoxicillin-Pot Clavulanate Hives    Pcn [Penicillins] Hives    Sulfa (Sulfonamide Antibiotics) Hives      Social History     Socioeconomic History    Marital status: SINGLE     Spouse name: Not on file    Number of children: Not on file    Years of education: Not on file    Highest education level: Not on file   Tobacco Use    Smoking status: Former Smoker    Smokeless tobacco: Never Used    Tobacco comment: socially when younger   Substance and Sexual Activity    Alcohol use: Yes     Frequency: 2-3 times a week     Drinks per session: 1 or 2     Binge frequency: Never     Comment: rarely    Drug use: Never    Sexual activity: Yes     Partners: Male     Birth control/protection: Pill      Family History   Problem Relation Age of Onset    Hypertension Mother     No Known Problems Father     Stroke Maternal Grandmother     Heart Disease Maternal Grandfather         CABG    Heart Attack Maternal Grandfather     High Cholesterol Maternal Grandfather     Cancer Maternal Grandfather         bone    Cancer Paternal Grandfather         bone    Diabetes Neg Hx     Thyroid Disease Neg Hx       Past Surgical History:   Procedure Laterality Date    HX APPENDECTOMY  2016    HX COLPOSCOPY      HX TONSIL AND ADENOIDECTOMY      HX TYMPANOSTOMY      HX WISDOM TEETH EXTRACTION        Past Medical History:   Diagnosis Date    Abnormal Pap smear of cervix     s/p colpo    Constipation       Current Outpatient Medications   Medication Sig Dispense Refill    phentermine (ADIPEX_P) 15 mg capsule Take 1 Cap by mouth every morning. Max Daily Amount: 15 mg. 30 Cap 1    SPRINTEC, 28, 0.25-35 mg-mcg tab Take 1 Tab by mouth daily.  polyethylene glycol (MIRALAX) 17 gram/dose powder Take one capful BID for 7 days and then once/day afterwards. 510 g 1        Assessment/ Plan:   Diagnoses and all orders for this visit:    1. Weight loss counseling, encounter for  -    Lost 2 pounds since last visit. Continue follow up with nutritionist.    Start exercise and increase work out time. phentermine (ADIPEX_P) 15 mg capsule; Take 1 Cap by mouth every morning. Max Daily Amount: 15 mg.    2. BMI 29.0-29.9,adult  -     phentermine (ADIPEX_P) 15 mg capsule; Take 1 Cap by mouth every morning. Max Daily Amount: 15 mg. Medication risks/benefits/costs/interactions/alternatives discussed with patient.   Advised patient to call back or return to office if symptoms worsen/change/persist. If patient cannot reach us or should anything more severe/urgent arise he/she should proceed directly to the nearest emergency department. Discussed expected course/resolution/complications of diagnosis in detail with patient. Patient given a written after visit summary which includes her diagnoses, current medications and vitals. Patient expressed understanding with the diagnosis and plan. Follow-up and Dispositions    · Return in about 2 months (around 3/26/2021) for weight management.

## 2021-01-26 NOTE — PROGRESS NOTES
Chief Complaint   Patient presents with    Weight Management     3 most recent PHQ Screens 1/26/2021   Little interest or pleasure in doing things Several days   Feeling down, depressed, irritable, or hopeless Several days   Total Score PHQ 2 2     Abuse Screening Questionnaire 1/26/2021   Do you ever feel afraid of your partner? N   Are you in a relationship with someone who physically or mentally threatens you? N   Is it safe for you to go home? Y     Visit Vitals  /84 (BP 1 Location: Left arm, BP Patient Position: Sitting)   Pulse 68   Temp 98 °F (36.7 °C) (Oral)   Resp 16   Ht 5' 10.5\" (1.791 m)   Wt 215 lb 3.2 oz (97.6 kg)   SpO2 96%   BMI 30.44 kg/m²     1. Have you been to the ER, urgent care clinic since your last visit? Hospitalized since your last visit?no    2. Have you seen or consulted any other health care providers outside of the [address] [place] [address] since your last visit? Include any pap smears or colon screening.  no

## 2021-01-26 NOTE — PROGRESS NOTES
[facility]  Follow-up Nutrition Appointment    Patient's Name: Lc Duff  Date: 1/26/2021  Reason for Referral: wt loss  Referral Source: Yoni العراقي MD    Nutrition Assessment  Pertinent Medical History:  Past Medical History:   Diagnosis Date    Abnormal Pap smear of cervix     s/p colpo    Constipation      Past Surgical History:   Procedure Laterality Date    HX APPENDECTOMY  2016    HX COLPOSCOPY      HX TONSIL AND ADENOIDECTOMY      HX TYMPANOSTOMY      HX WISDOM TEETH EXTRACTION       Patient Active Problem List   Diagnosis Code    Obesity (BMI 30.0-34. 9) E66.9       New Pertinent Medications/Supplements:  None - stopped phentermine for a bit because she ran out on a work trip; got a refill today but not sure if she is going to start it again - she wants to try to not take it since she did not lose weight from it. New Pertinent Biochemical Data:  none    Anthropometrics:  Height:   Ht Readings from Last 1 Encounters:   01/26/21 5' 10.5\" (1.791 m)     Weight:   Wt Readings from Last 1 Encounters:   01/26/21 211 lb (95.7 kg)     IBW: 68.2kg (141%IBW)     UBW: 91 kg     Adjusted BW: n/a kg  BMI: 29.85 kg/m2     BMI indicative of: Overweight  Goal Weight: 86.3 kg  Weight hx: Wt Readings from Last 3 Encounters:   01/26/21 211 lb (95.7 kg)   12/04/20 213 lb (96.6 kg)   03/31/20 220 lb (99.8 kg)       Food- and nutrition-related history:  Pt weighed 211# today, which means she has not gained any weight since our last visit. She reports some of her pants felt a little looser but did not notice many other physical differences. She did confirm that she was having ~2 bowel movements a week instead of 1 every 7-10 days since eating more vegetables and using healthy plate as a guide. She also feels less bloated.   Ms. Magnolia Santo had some questions about workouts to do from home since she travels for work a good amount and does not want to go to the gym right away after doing so in order to protect the other people who go. We discussed jump roping, tabata, workout vidoes on YouTube, [employer], and others. After discussing food intake she reports when she tracked her calories with a  a while back she barely reached 1400 calories. I explained to her that since she has more muscle with the amount of weight training she does, her energy expenditure is probably even higher than what I calculated (see below), and she seemed surprised. If she wants to lose weight, she should have a ~500 calorie deficit which would put her around 2,000 calories per day (but I said I don't even know if this is correct due to muscle mass she may have). She says she skips lunch a lot and we agreed that this is contributing to her lack of intake. I also described what happens when her body is in such a low deficit for so long - it starts to hold on to whatever she gives it and will probably not allow her to lose weight. She really wants to start eating 3 meals a day so we made a goal together about it. We discussed possible lunch options like sandwiches, to go packs cheese, nuts, and adding some proteins and vegetables. She likes chicken salad so she could make some and eat it with crackers, vegetables, etc. I explained that she is extremely healthy even though it may not feel like it with her weight and she should not be concerned about her health at this point. She verbalized understanding and did not have any further questions.     24-hour recall:  Breakfast (8-9am): cup of coffee with 1/2 premier protein shake, 1 c blueberry frosted mini wheats with almond milk; usually makes vegetable hash with 2 eggs over top of it  Lunch (1-1:30 pm): skipped but will normally have leftovers from night before or PB&J on wheat bread  Dinner (~6:30): carrots and onions, pork loin, lima beans, and potatoes  Snacks (n/a): n/a  Beverages: coffee, ~100 oz water; will have smoothie using Naked fruit drink and ice for breakfast ~3x/week    Estimated calorie intake per recall: 2768-5332 kcal (Inadequate)  Estimated protein intake per recall: 40-50 gm protein (Inadequate)    Activity level: Active    Pt's progress on previous goals: pt reached both goals 100% of the time; eats more vegetables at meals and uses healthy plate as inspiration; increased cardio to 25-30 mins 1-2x/week     Estimated Nutrition Needs:  Calorie needs (using [place] 1898 x 1. 5AF): 2655 kcal/day  Protein needs (using 0.8-1 gm/kg Actual BW): 77-96 gm protein/day    Nutrition Diagnosis: (NC-3.4) Unintended weight gain related to food and nutrition knowledge deficit as evidenced by lack of vegetable intake per pt recall, pt report of >1 gallon water intake per day (most days). Improved, continued Pt has not gained more weight and has increased vegetable intake - hoping to lose weight    Nutrition Intervention:  (NP-1.1) General healthful diet - recommended fruit and vegetable intake of 1 cup (~2 servings) for lunch and dinner  (NP-1.1) Recommended fluid intake of less than 1 gallon per day    New interventions: (ND-1) Modify schedule of foods to limit fasting    Patient Goals: Pt will eat lunch 3 days per week  Either by eating leftovers or finding new meals to make that allow her to look forward to what she is eating for lunch    Resources Provided/Reviewed: none  Information reviewed with: Patient Pt was receptive and exhibited understanding of the material presented    Nutrition Monitoring/Evaluation:  (12/11/2020): (FH-1.1. 1) Energy intake using 24 hr recall - reach about 2,000 calories per day in the next 2 months  (FH-1.2. 1) Fluid intake using 24 hr recall - goal met - drinking 3/4 gallon every day  (AD-1.1) Weight history over the next 6 weeks - pt lost 2 pounds, but has not gained weight - continue monitoring for 0.5-1 lb lost per week    MNT Follow-up Visit: pt going to call when she schedules her 2 month follow-up with Dr. Pro Wills Junito Flynn RD on 1/26/2021 at 11:50 AM    An electronic signature was used to authenticate this note.  I was in the office for the appointment and time spent: 46 minutes

## 2022-03-20 PROBLEM — E66.9 OBESITY (BMI 30.0-34.9): Status: ACTIVE | Noted: 2020-02-18

## 2023-05-19 RX ORDER — PHENTERMINE HYDROCHLORIDE 15 MG/1
15 CAPSULE ORAL
COMMUNITY
Start: 2021-01-26

## 2023-05-19 RX ORDER — POLYETHYLENE GLYCOL 3350 17 G/17G
POWDER, FOR SOLUTION ORAL
COMMUNITY
Start: 2020-06-23

## 2023-05-19 RX ORDER — NORGESTIMATE AND ETHINYL ESTRADIOL 0.25-0.035
1 KIT ORAL DAILY
COMMUNITY
Start: 2019-12-13

## 2023-09-25 ENCOUNTER — OFFICE VISIT (OUTPATIENT)
Age: 33
End: 2023-09-25

## 2023-09-25 VITALS
RESPIRATION RATE: 22 BRPM | HEART RATE: 84 BPM | DIASTOLIC BLOOD PRESSURE: 87 MMHG | HEIGHT: 70 IN | WEIGHT: 214 LBS | TEMPERATURE: 98.8 F | OXYGEN SATURATION: 98 % | BODY MASS INDEX: 30.64 KG/M2 | SYSTOLIC BLOOD PRESSURE: 133 MMHG

## 2023-09-25 DIAGNOSIS — N39.0 URINARY TRACT INFECTION WITHOUT HEMATURIA, SITE UNSPECIFIED: Primary | ICD-10-CM

## 2023-09-25 LAB
BILIRUBIN, URINE, POC: NEGATIVE
BLOOD URINE, POC: NEGATIVE
GLUCOSE URINE, POC: NEGATIVE
KETONES, URINE, POC: NEGATIVE
LEUKOCYTE ESTERASE, URINE, POC: NEGATIVE
NITRITE, URINE, POC: NEGATIVE
PH, URINE, POC: 5.5 (ref 4.6–8)
PROTEIN,URINE, POC: NEGATIVE
SPECIFIC GRAVITY, URINE, POC: 1.02 (ref 1–1.03)
URINALYSIS CLARITY, POC: NORMAL
URINALYSIS COLOR, POC: YELLOW
UROBILINOGEN, POC: NORMAL

## 2023-09-25 RX ORDER — NITROFURANTOIN 25; 75 MG/1; MG/1
100 CAPSULE ORAL 2 TIMES DAILY
Qty: 10 CAPSULE | Refills: 0 | Status: SHIPPED | OUTPATIENT
Start: 2023-09-25 | End: 2023-09-30

## 2023-09-25 NOTE — PROGRESS NOTES
Walter Garcia ( 1990) is a 33 y.o. female, New Patient patient, here for evaluation of the following chief complaint(s):  Urinary Tract Infection (For 3 days frequent urination and pressure. No burning when urinating )       Information provided by, or accompanied by:   Patient. ASSESSMENT/PLAN:  Sarah Adjutant was seen today for urinary tract infection. Diagnoses and all orders for this visit:    Urinary tract infection without hematuria, site unspecified  -     AMB POC URINALYSIS DIP STICK AUTO W/O MICRO  -     Cancel: Urine culture (clean catch)  -     Culture, Urine; Future  -     Culture, Urine  -     nitrofurantoin, macrocrystal-monohydrate, (MACROBID) 100 MG capsule; Take 1 capsule by mouth 2 times daily for 5 days       To ER if worse, will cholo if culture indicates need to D/C or change med. Return in about 1 week (around 10/2/2023), or if symptoms worsen or fail to improve, for UTI. History provided by:  Patient   used: No    Urinary Tract Infection  This is a new problem. Episode onset: 3 days. The problem has been gradually worsening since onset. Review of Systems   Genitourinary:  Positive for frequency and urgency. All other systems reviewed and are negative.         Subjective:   Pt is a 33 y.o. female     Past Medical History:   Diagnosis Date    Abnormal Pap smear of cervix     s/p colpo    Constipation        Past Surgical History:   Procedure Laterality Date    APPENDECTOMY  2016    COLPOSCOPY      TONSILLECTOMY AND ADENOIDECTOMY      TYMPANOSTOMY TUBE PLACEMENT      WISDOM TOOTH EXTRACTION           Results for orders placed or performed in visit on 09/25/23   AMB POC URINALYSIS DIP STICK AUTO W/O MICRO   Result Value Ref Range    Color, Urine, POC Yellow     Clarity, Urine, POC Cloudy     Glucose, Urine, POC Negative Negative    Bilirubin, Urine, POC Negative Negative    Ketones, Urine, POC Negative Negative    Specific Gravity, Urine, POC 1.025

## 2023-09-27 LAB
BACTERIA SPEC CULT: NORMAL
CC UR VC: NORMAL
SERVICE CMNT-IMP: NORMAL